# Patient Record
(demographics unavailable — no encounter records)

---

## 2024-11-12 NOTE — HISTORY OF PRESENT ILLNESS
[de-identified] : 77 yo m with hx of anxiety, horseshoe kidney with chronic hydro, macular degeneration - recent episode of abdominal pain - seen at [facility] in [place].  CT scan with sigmoid thickening- likely diverticulitis.  Incidental 6mm lung nodule -RLL - Has a hx of abd pain - worked up with Dr Goldberg - gastritis- used tot take PPI but stopped due to horseshoe kidney.   Had taken pepcid, tums, gaviscon.  CT scan also showed esophagitis. - constipation - improved with papaya and colace.  still on a bland diet.  normal BM this AM.   - Seeing Dr Lebron for his eft knee- getting 2nd gel inj today. - Anxiety has been an issue  - has been less active.   - Cardiologist - Went in February- in [place] - all OK -  - power walks without issue. no sob, no cp.  - on treadmill at 4 mph for an hour.   - BP at home: 115/60, p- 50s - sleep - not as good, takes an occasional 1/2 xaanx.  but noramlly does not.   - had flu and covid vaccine.   - still going to AA meetings regularly.  has been 35 years. - hearing issues-  going to see Dr Maurilio Clemens in [facility] - hearing test in [place] - off. - s/p ophtho eval  -Dr Israel - cataract is forming.- plan for surgeyr in [facility] - in the past he has not tolerated a statin - felt sick.   note: 6/4/24  - walks 3 miles power walk daily - about 4 weeks ago noticed calf pain.  - right side only.   - currently no pain-  but feels it with foot flexion - tighness at [place].  feels it with walking for awhile.   - DALTON done at home was 1.0.   - no preceeding flights or long drives   note: 11/9/23 exercising regularly.  some OA   -stiffness in AM - following with Dr LASHAWN Lebron - right knee pain - s/p gel inj.  - recently twiseted his right leg - taking tylenol and occasional ibuprofen  - has longstanding intermittent back pain.   s.p colonoscopy with Dr Goldberg in 2017 due n 2027  following with cardiology in [place].  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   eats well, walks regularly.  , keeps weight under 200 lbs.   Checking BP at home  118/60 In AA for 34 years.  Using alprazoalam PRN.  not frequently Uses arnica gel.   taking a lot of supplements    Vaccines- had RSV, flu, and covid vax

## 2024-11-12 NOTE — REVIEW OF SYSTEMS
[Vision Problems] : vision problems [Hearing Loss] : hearing loss [Joint Pain] : joint pain [Negative] : Psychiatric [Pain] : no pain [Itching] : no itching [FreeTextEntry7] : as above

## 2024-11-12 NOTE — PHYSICAL EXAM
[No Edema] : there was no peripheral edema [Normal] : affect was normal and insight and judgment were intact [de-identified] : pectus excavatum

## 2024-11-12 NOTE — PLAN
[FreeTextEntry1] : WEllness compelte labs today Elevated lipids  Discussed a CAC screen - will think about it, can f/up with his cardiologist.   GI referral for EGD Lung nodule - CT scan for March CT chest. Cardiology f/up in February.   EKG Rbbb present Horseshoe kidney - reviewed renal scan from 2017.  As long as renal function remains stable, no need for further workup now. Anxiety- prn xanax - limits use,

## 2025-01-07 NOTE — HISTORY OF PRESENT ILLNESS
[Coronary Artery Disease] : coronary artery disease [No Pertinent Pulmonary History] : no history of asthma, COPD, sleep apnea, or smoking [No Adverse Anesthesia Reaction] : no adverse anesthesia reaction in self or family member [(Patient denies any chest pain, claudication, dyspnea on exertion, orthopnea, palpitations or syncope)] : Patient denies any chest pain, claudication, dyspnea on exertion, orthopnea, palpitations or syncope [Excellent (>10 METs)] : Excellent (>10 METs) [Aortic Stenosis] : no aortic stenosis [Atrial Fibrillation] : no atrial fibrillation [Recent Myocardial Infarction] : no recent myocardial infarction [Implantable Device/Pacemaker] : no implantable device/pacemaker [FreeTextEntry1] : cataract [FreeTextEntry2] : 11/9/25 [FreeTextEntry3] : Dr Dennis Israel [FreeTextEntry4] : 75 yo M with hx of horseshoe kidney, anxiety, ascvd (CAC of 1735) worsening vision - needs b/l cataracts.  very active, healthy diet.  EST normal 1/6/24

## 2025-01-07 NOTE — HISTORY OF PRESENT ILLNESS
[Coronary Artery Disease] : coronary artery disease [No Pertinent Pulmonary History] : no history of asthma, COPD, sleep apnea, or smoking [No Adverse Anesthesia Reaction] : no adverse anesthesia reaction in self or family member [(Patient denies any chest pain, claudication, dyspnea on exertion, orthopnea, palpitations or syncope)] : Patient denies any chest pain, claudication, dyspnea on exertion, orthopnea, palpitations or syncope [Excellent (>10 METs)] : Excellent (>10 METs) [Aortic Stenosis] : no aortic stenosis [Atrial Fibrillation] : no atrial fibrillation [Recent Myocardial Infarction] : no recent myocardial infarction [Implantable Device/Pacemaker] : no implantable device/pacemaker [FreeTextEntry1] : cataract [FreeTextEntry2] : 11/9/25 [FreeTextEntry3] : Dr Dennis Israel [FreeTextEntry4] : 77 yo M with hx of horseshoe kidney, anxiety, ascvd (CAC of 1735) worsening vision - needs b/l cataracts.  very active, healthy diet.  EST normal 1/6/24